# Patient Record
Sex: MALE | Race: WHITE
[De-identification: names, ages, dates, MRNs, and addresses within clinical notes are randomized per-mention and may not be internally consistent; named-entity substitution may affect disease eponyms.]

---

## 2019-09-19 PROBLEM — Z00.00 ENCOUNTER FOR PREVENTIVE HEALTH EXAMINATION: Status: ACTIVE | Noted: 2019-09-19

## 2019-10-03 ENCOUNTER — APPOINTMENT (OUTPATIENT)
Dept: THORACIC SURGERY | Facility: CLINIC | Age: 69
End: 2019-10-03
Payer: SELF-PAY

## 2019-10-03 VITALS
WEIGHT: 184 LBS | RESPIRATION RATE: 16 BRPM | OXYGEN SATURATION: 98 % | HEIGHT: 72 IN | DIASTOLIC BLOOD PRESSURE: 89 MMHG | SYSTOLIC BLOOD PRESSURE: 165 MMHG | HEART RATE: 61 BPM | BODY MASS INDEX: 24.92 KG/M2

## 2019-10-03 DIAGNOSIS — Z77.128 CONTACT WITH AND (SUSPECTED) EXPOSURE TO OTHER HAZARDS IN THE PHYSICAL ENVIRONMENT: ICD-10-CM

## 2019-10-03 DIAGNOSIS — Z86.39 PERSONAL HISTORY OF OTHER ENDOCRINE, NUTRITIONAL AND METABOLIC DISEASE: ICD-10-CM

## 2019-10-03 DIAGNOSIS — Z87.898 PERSONAL HISTORY OF OTHER SPECIFIED CONDITIONS: ICD-10-CM

## 2019-10-03 DIAGNOSIS — F17.200 NICOTINE DEPENDENCE, UNSPECIFIED, UNCOMPLICATED: ICD-10-CM

## 2019-10-03 PROCEDURE — 99205 OFFICE O/P NEW HI 60 MIN: CPT

## 2019-10-03 RX ORDER — AMOXICILLIN AND CLAVULANATE POTASSIUM 500; 125 MG/1; MG/1
500-125 TABLET, FILM COATED ORAL
Qty: 14 | Refills: 0 | Status: ACTIVE | COMMUNITY
Start: 2019-10-03 | End: 1900-01-01

## 2019-10-04 PROBLEM — F17.200 CURRENT SMOKER: Status: ACTIVE | Noted: 2019-10-04

## 2019-10-04 PROBLEM — Z86.39 HISTORY OF HYPERLIPIDEMIA: Status: RESOLVED | Noted: 2019-10-04 | Resolved: 2019-10-04

## 2019-10-04 PROBLEM — Z77.128 EXPOSURE TO ENVIRONMENTAL TOXIC SUBSTANCES: Status: ACTIVE | Noted: 2019-10-04

## 2019-10-04 PROBLEM — Z87.898 FORMER CONSUMPTION OF ALCOHOL: Status: ACTIVE | Noted: 2019-10-04

## 2019-10-04 NOTE — ASSESSMENT
[FreeTextEntry1] : Mr. TOY SKELTON, 69 year old male, current smoker, w/ hx of HLD (diet control), who presented to PCP Dr. Bassem Galvan for routine physical exam, sent for lung CA screening due to chronic hx of smoking.\par \par CT Chest on 9/10/19:\par - 6mm RUL nodule (series 3 image 55)\par - 4mm RUL nodule (image 63)\par - 7mm solid RLL nodule (image 117)\par - 5mm CELESTINA (image 63)\par - 1cm lingular nodule (image 119)\par - 6mm LLL solid nodule (image 130)\par - multiple subcentimeter hepatic cysts\par - a 1.7cm Rt midpole renal cyst\par \par PET/CT on 9/17/19:\par - 9mm SUV=1.8 RLL solid nodule (image 117)\par - 1cm SUV=1.3 lingular solid nodule (image 116)\par - new focus of airspace disease in the RLL w/ SUV=7.0\par - scattered hepatic cysts\par - bilateral renal cysts\par \par I have reviewed the patient's medical records and diagnostic images at time of this office consultation and have made the following recommendation:\par 1. CT scan reviewed, severe emphysema noted which make patient at much higher risk for PTX if performing FNA, and multiple smaller lung nodules are likely inflammatory, I recommended a course of Augmentin 500mg x 7 days, then repeat CT Chest w/out contrast in 3 months and RTC.\par \par \par Written by Errol Flynn NP, acting as a scribe for Dr. Charles Son.\par \par The documentation recorded by the scribe accurately reflects the service I personally performed and the decisions made by me. CHARLES SON MD\par

## 2019-10-04 NOTE — DATA REVIEWED
[FreeTextEntry1] : CT Chest on 9/10/19:\par - 6mm RUL nodule (series 3 image 55)\par - 4mm RUL nodule (image 63)\par - 7mm solid RLL nodule (image 117)\par - 5mm CELESTINA (image 63)\par - 1cm lingular nodule (image 119)\par - 6mm LLL solid nodule (image 130)\par - multiple subcentimeter hepatic cysts\par - a 1.7cm Rt midpole renal cyst\par \par PET/CT on 9/17/19:\par - 9mm SUV=1.8 RLL solid nodule (image 117)\par - 1cm SUV=1.3 lingular solid nodule (image 116)\par - new focus of airspace disease in the RLL w/ SUV=7.0\par - scattered hepatic cysts\par - bilateral renal cysts

## 2019-10-04 NOTE — HISTORY OF PRESENT ILLNESS
[FreeTextEntry1] : Mr. TOY SKELTON, 69 year old male, current smoker, w/ hx of HLD (diet control), who presented to PCP Dr. Bassem Galvan for routine physical exam, sent for lung CA screening due to chronic hx of smoking.\par \par CT Chest on 9/10/19:\par - 6mm RUL nodule (series 3 image 55)\par - 4mm RUL nodule (image 63)\par - 7mm solid RLL nodule (image 117)\par - 5mm CELESTINA (image 63)\par - 1cm lingular nodule (image 119)\par - 6mm LLL solid nodule (image 130)\par - multiple subcentimeter hepatic cysts\par - a 1.7cm Rt midpole renal cyst\par \par PET/CT on 9/17/19:\par - 9mm SUV=1.8 RLL solid nodule (image 117)\par - 1cm SUV=1.3 lingular solid nodule (image 116)\par - new focus of airspace disease in the RLL w/ SUV=7.0\par - scattered hepatic cysts\par - bilateral renal cysts\par \par Patient is here today for CT Sx consultation, referred by Dr. Galvan. Admits to on/off productive sputum.

## 2019-10-04 NOTE — CONSULT LETTER
[Dear  ___] : Dear  [unfilled], [Consult Letter:] : I had the pleasure of evaluating your patient, [unfilled]. [( Thank you for referring [unfilled] for consultation for _____ )] : Thank you for referring [unfilled] for consultation for [unfilled] [Please see my note below.] : Please see my note below. [Consult Closing:] : Thank you very much for allowing me to participate in the care of this patient.  If you have any questions, please do not hesitate to contact me. [Sincerely,] : Sincerely, [FreeTextEntry2] : Bassem Galvan MD (PCP/Referring) [FreeTextEntry3] : Charles Son MD, MPH \par System Director of Thoracic Surgery \par Director of Comprehensive Lung and Foregut [place] \par Professor Cardiovascular & Thoracic Surgery  \par [facility] [employer] at [facility]\par

## 2020-01-21 ENCOUNTER — MESSAGE (OUTPATIENT)
Age: 70
End: 2020-01-21

## 2020-01-23 ENCOUNTER — APPOINTMENT (OUTPATIENT)
Dept: THORACIC SURGERY | Facility: CLINIC | Age: 70
End: 2020-01-23

## 2020-04-29 PROBLEM — R91.8 LUNG NODULE, MULTIPLE: Status: ACTIVE | Noted: 2019-10-02

## 2020-04-30 ENCOUNTER — APPOINTMENT (OUTPATIENT)
Dept: THORACIC SURGERY | Facility: CLINIC | Age: 70
End: 2020-04-30
Payer: SELF-PAY

## 2020-04-30 DIAGNOSIS — R91.8 OTHER NONSPECIFIC ABNORMAL FINDING OF LUNG FIELD: ICD-10-CM

## 2020-04-30 PROCEDURE — 99441: CPT

## 2020-04-30 NOTE — CONSULT LETTER
[Dear  ___] : Dear  [unfilled], [Consult Letter:] : I had the pleasure of evaluating your patient, [unfilled]. [Consult Closing:] : Thank you very much for allowing me to participate in the care of this patient.  If you have any questions, please do not hesitate to contact me. [( Thank you for referring [unfilled] for consultation for _____ )] : Thank you for referring [unfilled] for consultation for [unfilled] [Please see my note below.] : Please see my note below. [Sincerely,] : Sincerely, [FreeTextEntry2] : Bassem Galvan MD (PCP/Referring)  [FreeTextEntry3] : Charles Son MD, MPH \par System Director of Thoracic Surgery \par Director of Comprehensive Lung and Foregut [place] \par Professor Cardiovascular & Thoracic Surgery  \par [facility] [employer] at [facility]\par

## 2020-04-30 NOTE — HISTORY OF PRESENT ILLNESS
[Verbal consent obtained from patient] : the patient, [unfilled] [FreeTextEntry1] : Mr. TOY SKELTON, 70 year old male, current smoker, w/ hx of HLD (diet control), who presented to PCP Dr. Bassem Galvan for routine physical exam, sent for lung CA screening due to chronic hx of smoking.\par \par CT Chest on 9/10/19:\par - 6mm RUL nodule (series 3 image 55)\par - 4mm RUL nodule (image 63)\par - 7mm solid RLL nodule (image 117)\par - 5mm CELESTINA (image 63)\par - 1cm lingular nodule (image 119)\par - 6mm LLL solid nodule (image 130)\par - multiple subcentimeter hepatic cysts\par - a 1.7cm Rt midpole renal cyst\par \par PET/CT on 9/17/19:\par - 9mm SUV=1.8 RLL solid nodule (image 117)\par - 1cm SUV=1.3 lingular solid nodule (image 116)\par - new focus of airspace disease in the RLL w/ SUV=7.0\par - scattered hepatic cysts\par - bilateral renal cysts\par \par CT Chest on 4/23/20:\par - severe emphysema changes\par - stable nodules: 6 mm RUL (3:57), 4mm RUL (3:61), 7 mm RLL (2:120), 5 mm CELESTINA (3:65), 10mm lingular nodule (3:116), 6 mm LLL (3:126)\par - stable multiple small bilobar hepatic cysts\par \par Pt presents today for follow up via Telephonic visit.

## 2020-04-30 NOTE — ASSESSMENT
[FreeTextEntry1] : Mr. TOY SKELTON, 70 year old male, current smoker, w/ hx of HLD (diet control), who presented to PCP Dr. Bassem Galvan for routine physical exam, sent for lung CA screening due to chronic hx of smoking.\par \par CT Chest on 4/23/20:\par - severe emphysema changes\par - stable nodules: 6 mm RUL (3:57), 4mm RUL (3:61), 7 mm RLL (2:120), 5 mm CELESTINA (3:65), 10mm lingular nodule (3:116), 6 mm LLL (3:126)\par - stable multiple small bilobar hepatic cysts\par \par I have reviewed the patient's medical records and diagnostic images at time of this office consultation and have made the following recommendation:\par 1. CT reviewed with pt. Stable nodules, RTC in 6 mons with CT Chest w/o contrast. \par I spent 10 mins speaking with pt on the phone regarding above results and plan.\par \par \par I personally performed the services described in the documentation, reviewed the documentation recorded by the scribe in my presence and it accurately and completely records my words and actions. \par \par I, Madison Apodaca, VAISHNAVI, am scribing for and the presence of Dr. Charles Son the following sections, HISTORY OF PRESENT ILLNESS, PAST MEDICAL/FAMILY/SOCIAL HISTORY; REVIEW OF SYSTEMS; VITAL SIGNS; PHYSICAL EXAM; DISPOSITION.\par

## 2020-04-30 NOTE — DATA REVIEWED
[FreeTextEntry1] : CT Chest on 4/23/20:\par - severe emphysema changes\par - stable nodules: 6 mm RUL (3:57), 4mm RUL (3:61), 7 mm RLL (2:120), 5 mm CELESTINA (3:65), 10mm lingular nodule (3:116), 6 mm LLL (3:126)\par - stable multiple small bilobar hepatic cysts